# Patient Record
Sex: FEMALE | Race: BLACK OR AFRICAN AMERICAN | NOT HISPANIC OR LATINO | URBAN - NONMETROPOLITAN AREA
[De-identification: names, ages, dates, MRNs, and addresses within clinical notes are randomized per-mention and may not be internally consistent; named-entity substitution may affect disease eponyms.]

---

## 2017-01-13 NOTE — PATIENT DISCUSSION
CATARACTS, OU - VISUALLY SIGNIFICANT. SCHEDULE PHACO WITH IOL OS FIRST THEN LATER OD IF VISUAL SYMPTOMS PERSIST.   DISCUSSED SYMFONY

## 2017-01-13 NOTE — PATIENT DISCUSSION
MODERATE KERATOCONJUCTIVITIS WITH DRY EYE, OU:  CONTINUE HIGH QUALITY ARTIFICIAL TEARS BID &ndash; TID. RECOMMEND THE DAILY INTAKE OF OMEGA-3 DHA/EPA FATTY ACIDS TO HELP RELIEVE SYMPTOMS WITH PRIMARY CARE PHYSICIANS APPROVAL. ADD NIGHTLY LUBRICATING OINTMENT OR GEL. CONTINUE RESTASIS BID OU. WILL CONSIDER PUNCTAL PLUGS NEXT VISIT IF NOT RESPONSIVE OR IF SYMPTOMS PERSIST. RETURN FOR FOLLOW-UP AS SCHEDULED OR SOONER IF SYMPTOMS WORSEN.

## 2017-01-13 NOTE — PATIENT DISCUSSION
Scheduling Cataract Surgery Counseling: I have discussed the option of scheduling cataract surgery versus following, as well as the risks, benefits and alternatives of surgery with the patient. It was explained that the surgery is elective; there is no rush and no harm in waiting to have surgery. It was also explained that there is no guarantee that removing the cataract will improve their vision. The patient understands and desires to proceed with cataract surgery with implantation of an intraocular lens to improve their vision for driving and watching tv.

## 2017-01-13 NOTE — PATIENT DISCUSSION
Keratoconjuctivitis with dry eye Counseling: I have discussed the diagnosis and the pathophysiology of this disease with the patient. Vision may be limited by dry eye, and symptoms exacerbated by environmental factors such as smoke, wind, or prolonged eye use. Treatment options include, but are not limited to, artificial tears, punctal plugs, topical cyclosporine, oral omega-3 supplements, Lipiflow, moisture goggles, and lubricating ointments. I stressed the importance of compliance with treatment.

## 2017-02-02 NOTE — PATIENT DISCUSSION
New Prescription: Refresh Celluvisc (carboxymethylcellulose sodium): dropperette,gel: 1% 1 drop at bedtime into both eyes 02-

## 2017-02-15 NOTE — PATIENT DISCUSSION
Refractive Error Counseling:  I have discussed the options for refractive surgery to decrease dependency on glasses and/or contact lenses after cataract surgery. These options include: correction of astigmatism with limbal relaxing incision(s), LenSx arcuate incision(s), TORIC IOL, monovision, extended range of vision IOL, and multifocal IOL. It was emphasized to the patient that the goal of reducing spectacle dependence not spectacle freedom is more realistic. The patient understands it is possible they may still need a weak spectacle prescription to achieve their best vision. No visual outcome was guaranteed. The patient understands and desires to proceed with refractive cataract surgery.

## 2017-02-15 NOTE — PATIENT DISCUSSION
New Prescription: Ilevro (nepafenac): drops,suspension: 0.3% 1 drop every morning as directed into right eye 02-

## 2017-02-15 NOTE — PATIENT DISCUSSION
[facility]2017[phone] -&nbsp; &nbsp; &nbsp; rikki

## 2017-02-15 NOTE — PATIENT DISCUSSION
Continue: Refresh Celluvisc (carboxymethylcellulose sodium): dropperette,gel: 1% 1 drop at bedtime into both eyes 02-

## 2017-02-15 NOTE — PATIENT DISCUSSION
REFRACTIVE ERROR - PRESBYOPIA AND ASTIGMATISM:  PATIENT DESIRES TO HAVE THEIR REFRACTIVE ERROR SURGICALLY CORRECTED WITH A SYMFONY EXTENDED RANGE OF VISION IOL AND LENSX.

## 2017-02-15 NOTE — PATIENT DISCUSSION
New Prescription: Vigamox (moxifloxacin): drops: 0.5% 1 drop three times a day as directed into right eye 02-

## 2017-02-15 NOTE — PATIENT DISCUSSION
Surgery Drop Counseling:  I have prescribed Vigamox, Ilevro and Pred Forte for use as directed before and after cataract surgery.

## 2017-02-15 NOTE — PATIENT DISCUSSION
Surgery Counseling:  I have discussed the option of glasses versus cataract surgery versus following, It was explained that when vision no longer meets the patient's visual needs and a new prescription for glasses is not likely to improve the patient's visual symptoms, the option of cataract surgery is a reasonable next step. It was explained that there is no guarantee that removing the cataract will improve their visual symptoms; however, it is believed that the cataract is contributing to the patient's visual impairment and surgery may noticeably improve both the visual and functional status of the patient. After this discussion, the patient desires to proceed with cataract surgery with implantation of an intraocular lens to improve their vision for  driving and applying makeup.

## 2017-02-15 NOTE — PATIENT DISCUSSION
New Prescription: Pred Forte (prednisolone acetate): drops,suspension: 1% 1 drop three times a day as directed into right eye 02-

## 2017-03-08 NOTE — PATIENT DISCUSSION
S/P PC IOL, OS. : GOOD POST OP RESULT. STOP ANTIBIOTIC DROP IN ONE WEEK. CONTINUE OTHER DROPS AS DIRECTED UNTIL FURTHER INSTRUCTION. RETURN FOR FOLLOW-UP IN 3 WEEKS. CONSIDER SWITCHING FROM Bari Handsome TO RESTASIS DUE TO COST.

## 2017-03-08 NOTE — PATIENT DISCUSSION
Continue: Pred Forte (prednisolone acetate): drops,suspension: 1% 1 drop as directed into both eyes 02-

## 2017-03-27 NOTE — PATIENT DISCUSSION
Post-Op Instructions OD: Pred Forte (Prednisolone) In 2 days reduce to 1 time per day for 1 week, then discontinue. Ilevro (Nepafenac) 1 time per day for 9 days, then discontinue.

## 2017-03-27 NOTE — PATIENT DISCUSSION
*PATIENT WAS PRESCRIBED XIIDRA BY DR. Nadeem Bermudez BUT DID NOT RECEIVE A VOUCHER CARD TO USE AT THE [facility]. I GAVE HER A SAMPLE PACK OF Malu Chol, VOUCHER CARD AND INFORMATION BROCHURE AT TODAY'S VISIT. OKAY TO START USING ONCE POSTOP DROPS ARE COMPLETED.

## 2017-03-27 NOTE — PATIENT DISCUSSION
PCF, OD: BECOMING VISUALLY SIGNIFICANT. CONTINUE TO FOLLOW FOR NOW AND FOLLOW UP WITH DR. STILES IN 3 MONTHS FOR A PCF CHECK, OR SOONER IF SYMPTOMS WORSEN. OFFER SPEC RX UPDATE.

## 2017-03-27 NOTE — PATIENT DISCUSSION
S/P PE IOL, OU:  DOING WELL. CONTINUE DROPS AS DIRECTED. SPECS RX OFFERED. RX ARC IN SPECS TO MINIMIZE GLARE. RETURN FOR FOLLOW-UP AS SCHEDULED.

## 2017-05-05 NOTE — PATIENT DISCUSSION
MODERATE KERATOCONJUCTIVITIS WITH DRY EYE, OU:  CONTINUE HIGH QUALITY ARTIFICIAL TEARS BID &ndash; TID. RECOMMEND THE DAILY INTAKE OF OMEGA-3 DHA/EPA FATTY ACIDS TO HELP RELIEVE SYMPTOMS WITH PRIMARY CARE PHYSICIANS APPROVAL. STOP RESTASIS TODAY. WILL CONSIDER PUNCTAL PLUGS NEXT VISIT IF NOT RESPONSIVE OR IF SYMPTOMS PERSIST. RETURN FOR FOLLOW-UP AS SCHEDULED OR SOONER IF SYMPTOMS WORSEN.

## 2017-05-05 NOTE — PATIENT DISCUSSION
OCULAR ROSACEA, OU:  PRESCRIBE WARM COMPRESSES,  EYELID SCRUBS QD-BID, AND GOOD QUALITY ARTIFICIAL TEARS BID-QID. RECOMMEND THE DAILY INTAKE OF OMEGA-3 FATTY ACIDS WITH PRIMARY CARE PHYSICIANS APPROVAL. PRESCRIBE MINOCYCLINE 50MG ONCE A DAY WITH BREAKFAST AND MAXITROL OINTMENT TO LIDS BEFORE BEDTIME. WILL CONSIDER  LIPIFLOW FOR EXACERBATIONS IF SYMPTOMS WORSEN. REFER TO DR. Raquel Bertrand FOR EVALUATION AND TREATMENT.

## 2017-05-05 NOTE — PATIENT DISCUSSION
New Prescription: Maxitrol (neomycin-polymyxin-dexameth): ointment: 3.5-10,000-0.1 mg-unit/g-% a small amount at bedtime on eyelid

## 2017-05-05 NOTE — PATIENT DISCUSSION
Stopped Today: Restasis (cyclosporine): dropperette: 0.05% 1 drop twice a day as directed into both eyes

## 2017-05-05 NOTE — PATIENT DISCUSSION
Conjunctivochalasis Counseling:  I have explained the diagnosis, also referred to as redundant conjunctiva, and its pathophysiology to the patient. This condition is usually asymptomatic and related to the aging eye. It can cause irritation, watery eyes, redness and may cause blurring of vision while reading. Treatment options include artificial tears, antihistamines, and topical steroids. If medical therapy is unsuccessful, surgical resection of the redundant conjunctival tissues is indicated. No treatment is needed while the patient is asymptomatic.

## 2017-06-14 NOTE — PATIENT DISCUSSION
AZAR/K-Sicca OU - Continue daily warm compresses, and artificial tears 2-3 times a day OU. Continue oral PRN DE fish oil. Refresh Celluvisc QHS/QAM OU.  Refresh Optive Advanced 2-3 times a day OU

## 2017-06-14 NOTE — PATIENT DISCUSSION
CONJUNCTIVOCHALASIS, OU:  Continue daily warm compresses. Discussed conjuntivoplasty in office (whichever is more bothersome). One eye at a time. stressed whether in office or operating room she will need a .

## 2017-06-14 NOTE — PATIENT DISCUSSION
Continue: Maxitrol (neomycin-polymyxin-dexameth): ointment: 3.5-10,000-0.1 mg-unit/g-% a small amount at bedtime on eyelid

## 2017-06-28 NOTE — PATIENT DISCUSSION
MODERATE / SEVERE  DRY EYE, OU: PRESCRIBED ARTIFICIAL TEARS 2-3 X A DAY OU AND REFRESH CELLUVISC QAM AND QHS OU. RECOMMENDS OMEGA-3 FISH OIL WITH PRIMARY CARE PHYSICIANS APPROVAL. RETURN FOR FOLLOW-UP AS SCHEDULED OR SOONER IF SYMPTOMS WORSEN.

## 2017-06-28 NOTE — PATIENT DISCUSSION
POSTERIOR CAPSULAR FIBROSIS, OU:  VISUALLY SIGNIFICANT. SCHEDULE YAG CAPSULOTOMY OS FIRST THEN LATER YAG CAPSULOTOMY OD IF VISUAL SYMPTOMS PERSIST.

## 2017-09-07 NOTE — PATIENT DISCUSSION
S/P YAG OU : STABLE. OKAY TO USE +1.50 FOR READING AND +1.25 FOR COMPUTER WORK. RETURN AS SCHEDULED.

## 2018-02-26 NOTE — PATIENT DISCUSSION
Hordeolum Counseling:  I explained to the patient that a hordeolum is an inflammatory reaction to trapped oil secretions in the eyelid. I also explained that while hordeolums usually respond well to treatment, some people are prone to recurrences of them. The patient was instructed on the use of warm compresses on the hordeolum for ten minutes, four times per day. I also instructed the patient to begin using Ocusoft lid scrubs or lid scrubs with baby shampoo twice daily. Return for follow-up as scheduled or sooner if symptoms worsen.

## 2018-02-26 NOTE — PATIENT DISCUSSION
DRY EYE SYNDROME, OU: PRESCRIBED ARTIFICIAL TEARS BID - QID, OU. TREAT UNDERLYING BLEPHARITIS. RETURN FOR FOLLOW-UP AS SCHEDULED OR SOONER IF SYMPTOMS WORSEN.

## 2018-02-26 NOTE — PATIENT DISCUSSION
QID AND MAXITROL OINTMENT BID APPLIED WITH LID MASSAGE FOLLOWING WARM COMPRESSES UNTIL RESOLVED. PATIENT ADVISED TO RETURN TO CLINIC FOR FOLLOW UP IN 1 MONTH WITH DR RESENDIZ IF THERE IS NOT FULL RESOLUTION, OR SOONER IF SYMPTOMS WORSEN.

## 2018-02-26 NOTE — PATIENT DISCUSSION
BLEPHARITIS, OU: PRESCRIBE WARM COMPRESSES AND OCUSOFT PLUS EYELID SCRUBS BID, ARTIFICIAL TEARS (SYSTANE BALANCE) QID AND THE DAILY INTAKE OF OMEGA-3 FATTY ACIDS. WILL CONSIDER LIPIFLOW TREATMENT NEXT VISIT IF NOT RESPONSIVE TO TREATMENT OR IF SYMPTOMS PERSIST. RETURN FOR FOLLOW-UP AS SCHEDULED.

## 2018-02-26 NOTE — PATIENT DISCUSSION
New Prescription: Maxitrol (neomycin-polymyxin-dexameth): ointment: 3.5-10,000-0.1 mg-unit/g-% a small amount twice a day as directed into affected eye 02-

## 2018-06-07 NOTE — PATIENT DISCUSSION
CONJUNCTIVOCHALASIS, OU:  PRESCRIBE GOOD QUALITY ARTIFICIAL TEARS. RETURN FOR FOLLOW-UP AS SCHEDULED.

## 2018-06-07 NOTE — PATIENT DISCUSSION
*BLEPHARITIS, OU: PRESCRIBE WARM COMPRESSES AND EYELID SCRUBS QD-BID, ARTIFICIAL TEARS BID-QID, AND USE REFRESH CELLUVISC QHS OU. RECOMMEND THE DAILY INTAKE OF OMEGA-3 FATTY ACIDS WITH PRIMARY CARE PHYSICIANS APPROVAL. RETURN FOR FOLLOW-UP AS SCHEDULED.

## 2019-06-04 ENCOUNTER — IMPORTED ENCOUNTER (OUTPATIENT)
Dept: URBAN - NONMETROPOLITAN AREA CLINIC 1 | Facility: CLINIC | Age: 64
End: 2019-06-04

## 2019-06-04 PROBLEM — H52.223: Noted: 2019-06-04

## 2019-06-04 PROBLEM — H52.4: Noted: 2019-06-04

## 2019-06-04 PROBLEM — H52.03: Noted: 2019-06-04

## 2019-06-04 PROBLEM — E11.9: Noted: 2020-12-21

## 2019-06-04 PROCEDURE — 92004 COMPRE OPH EXAM NEW PT 1/>: CPT

## 2019-06-04 PROCEDURE — 92015 DETERMINE REFRACTIVE STATE: CPT

## 2019-06-12 NOTE — PATIENT DISCUSSION
*BLEPHARITIS, OU: PRESCRIBE WARM COMPRESSES AND EYELID SCRUBS QD-BID, ARTIFICIAL TEARS BID-QID, AND USE ERYTHROMYCIN OINTMENT QHS OU. RECOMMEND THE DAILY INTAKE OF OMEGA-3 FATTY ACIDS WITH PRIMARY CARE PHYSICIANS APPROVAL. RETURN FOR FOLLOW-UP AS SCHEDULED.

## 2019-06-12 NOTE — PATIENT DISCUSSION
New Prescription: erythromycin (erythromycin): ointment: 5 mg/gram (0.5 %) 1 a small amount at bedtime as directed on eyelid 06-

## 2020-06-22 NOTE — PATIENT DISCUSSION
Continue: Refresh Optive Advanced ([id number]): drops: 0.5-1-0.5% 1 drop as needed into both eyes

## 2020-06-22 NOTE — PATIENT DISCUSSION
Continue: erythromycin (erythromycin): ointment: 5 mg/gram (0.5 %) 1 a small amount at bedtime as needed on eyelid 06-

## 2020-06-26 NOTE — PATIENT DISCUSSION
Called patient back and patient stated she is not having any sx and will plush more fluids and will call us to make a f/u in 2-3 weeks.    Lens Material:

## 2020-12-21 ENCOUNTER — IMPORTED ENCOUNTER (OUTPATIENT)
Dept: URBAN - NONMETROPOLITAN AREA CLINIC 1 | Facility: CLINIC | Age: 65
End: 2020-12-21

## 2020-12-21 PROBLEM — E11.9: Noted: 2020-12-21

## 2020-12-21 PROCEDURE — 92014 COMPRE OPH EXAM EST PT 1/>: CPT

## 2020-12-21 PROCEDURE — 92015 DETERMINE REFRACTIVE STATE: CPT

## 2020-12-21 NOTE — PATIENT DISCUSSION
Hyperopia-Discussed diagnosis with patient. Astigmatism-Discussed diagnosis with patient. Presbyopia-Discussed diagnosis with patient. Updated spec Rx given. Recommend lens that will provide comfort as well as protect safety and health of eyes. DM s DR-Stressed the importance of keeping blood sugars under control blood pressure under control and weight normalization and regular visits with PCP. -Explained the possible effects of poorly controlled diabetes and the damage that diabetes can cause to ocular health. -Patient to check HgbA1C.-Pt instructed to contact our office with any vision changes.; Dr's Notes: Daughter Lee Mcguire in [facility].

## 2021-04-26 NOTE — PATIENT DISCUSSION
Continue: Refresh Optive Advanced ([id number]): drops: 0.5-1-0.5% 1 drop as needed into both eyes

## 2021-08-04 NOTE — PATIENT DISCUSSION
Continue: Refresh Optive Advanced ([id number]-[id number]): drops: 0.5-1-0.5% 1 drop as needed into both eyes

## 2021-08-04 NOTE — PATIENT DISCUSSION
DRY EYE SYNDROME/OCULAR ROSACEA/MGD OU: EDUCATED PATIENT ON FINDINGS AND CONDITIONS. PRESCRIBE PF ARTIFICIAL TEARS 4-6X/DAY OU, WARM COMPRESSES WITH MASSAGE QD-BID OU AND ERYTHROMYCIN QHS OU. ALSO PRESCRIBE DOXYCYCLINE 50MG BID PO X 1 MONTH. FOLLOW-UP IN 1 MONTH OR SOONER WITH ANY WORSENING OF SI/SX. MONITOR.

## 2021-09-02 NOTE — PATIENT DISCUSSION
OCULAR ROSACEA/MGD/DRY EYE SYNDROME OU: EDUCATED PATIENT ON FINDINGS AND CONDITIONS. MINIMAL IMPROVEMENT ON CURRENT TREATMENT WITH PERSISTENT INFLAMMATION. PRESCRIBE TOBRADEX QID OU X 2 WEEKS, THEN BID OU X 2 WEEKS THEN DISCONTINUE. CONTINUE PF ARTIFICIAL TEARS 4-6X/DAY OU, WARM COMPRESSES WITH MASSAGE QD-BID OU AND ERYTHROMYCIN DONAVON QHS OU. CONTINUE FULL COURSE OF DOXYCYCLINE 50MG BID PO AS PRESCRIBED. FOLLOW-UP IN 1 MONTH OR SOONER WITH ANY WORSENING OF SI/SX - WILL CONSIDER LIPIFLOW REFERRAL. MONITOR.

## 2021-09-02 NOTE — PATIENT DISCUSSION
CONJUNCTIVOCHALASIS OU: EDUCATED PATIENT ON FINDINGS. SIGNIFICANT AND LIKELY CONTRIBUTING TO PATIENT'S PERSISTENT IRRITATION. WILL CONSIDER REFERRAL TO DR. Marvin Ramirez FOR EVALUATION AT FOLLOW-UP VISIT. MONITOR.

## 2021-09-02 NOTE — PATIENT DISCUSSION
Continue: erythromycin (erythromycin): ointment: 5 mg/gram (0.5 %) a small amount at bedtime as directed into both eyes 08-.

## 2021-09-02 NOTE — PATIENT DISCUSSION
Continue: Refresh Optive Advanced ([id number]): drops: 0.5-1-0.5% 1 drop as needed into both eyes.

## 2021-09-02 NOTE — PATIENT DISCUSSION
Continue: doxycycline monohydrate (doxycycline monohydrate): capsule: 50 mg 1 capsule twice a day as directed by mouth 08-.

## 2021-09-02 NOTE — PATIENT DISCUSSION
New Prescription: tobramycin-dexamethasone (tobramycin-dexamethasone): drops,suspension: 0.3-0.1% 1 drop four times a day as directed into both eyes 09-.

## 2021-10-22 NOTE — PATIENT DISCUSSION
Educated patient on findings. Good improvement and patient feeling much better. Continue Refresh LISA-3 ATs QID/prn OU, warm compresses QD-BID OU, and erythromycin debby qhs OU. Prescribe eyelid scrubs QD-BID OU. Discussed good hygiene and proper makeup application. Advised to RTC if si/sx persist or worsen. Monitor.

## 2022-04-15 ASSESSMENT — TONOMETRY
OS_IOP_MMHG: 20
OS_IOP_MMHG: 21
OD_IOP_MMHG: 21
OD_IOP_MMHG: 20

## 2022-04-15 ASSESSMENT — VISUAL ACUITY
OD_CC: 20/25+2
OD_SC: 20/20
OD_CC: J1
OU_SC: 20/20
OS_CC: J1
OD_SC: 20/20
OS_SC: 20/20-1
OS_CC: 20/25+2
OS_SC: 20/20

## 2022-05-05 ENCOUNTER — COMPREHENSIVE EXAM (OUTPATIENT)
Dept: RURAL CLINIC 1 | Facility: CLINIC | Age: 67
End: 2022-05-05

## 2022-05-05 DIAGNOSIS — H52.223: ICD-10-CM

## 2022-05-05 DIAGNOSIS — H52.03: ICD-10-CM

## 2022-05-05 DIAGNOSIS — E11.9: ICD-10-CM

## 2022-05-05 DIAGNOSIS — H52.4: ICD-10-CM

## 2022-05-05 DIAGNOSIS — H25.13: ICD-10-CM

## 2022-05-05 PROCEDURE — 99214 OFFICE O/P EST MOD 30 MIN: CPT

## 2022-05-05 PROCEDURE — 92015 DETERMINE REFRACTIVE STATE: CPT

## 2022-05-05 ASSESSMENT — VISUAL ACUITY
OU_CC: 20/20
OD_CC: 20/20
OS_CC: 20/25

## 2022-05-05 ASSESSMENT — TONOMETRY
OS_IOP_MMHG: 20
OD_IOP_MMHG: 20

## 2022-05-26 NOTE — PATIENT DISCUSSION
Baseline HVF today non-specific inferior defect OD, WNL OS. No treatment indicated at this time. Repeat HVF 24-2 OD only in 6 months - if defect repeatable will consider initiating treatment. Follow-up as directed. Monitor.

## 2023-05-08 ENCOUNTER — COMPREHENSIVE EXAM (OUTPATIENT)
Dept: RURAL CLINIC 1 | Facility: CLINIC | Age: 68
End: 2023-05-08

## 2023-05-08 DIAGNOSIS — H52.4: ICD-10-CM

## 2023-05-08 DIAGNOSIS — E11.9: ICD-10-CM

## 2023-05-08 DIAGNOSIS — H52.223: ICD-10-CM

## 2023-05-08 DIAGNOSIS — H25.13: ICD-10-CM

## 2023-05-08 DIAGNOSIS — H52.03: ICD-10-CM

## 2023-05-08 PROCEDURE — 99214 OFFICE O/P EST MOD 30 MIN: CPT

## 2023-05-08 ASSESSMENT — TONOMETRY
OS_IOP_MMHG: 18
OD_IOP_MMHG: 18

## 2023-05-08 ASSESSMENT — VISUAL ACUITY
OU_CC: 20/20
OD_CC: 20/20-2
OS_CC: 20/20
OU_CC: 20/20
OS_CC: 20/20-1
OD_CC: 20/20

## 2024-05-09 ENCOUNTER — COMPREHENSIVE EXAM (OUTPATIENT)
Dept: RURAL CLINIC 1 | Facility: CLINIC | Age: 69
End: 2024-05-09

## 2024-05-09 DIAGNOSIS — H25.13: ICD-10-CM

## 2024-05-09 DIAGNOSIS — E11.9: ICD-10-CM

## 2024-05-09 DIAGNOSIS — H10.45: ICD-10-CM

## 2024-05-09 PROCEDURE — 92014 COMPRE OPH EXAM EST PT 1/>: CPT

## 2024-05-09 ASSESSMENT — VISUAL ACUITY
OU_CC: 20/20-1
OS_CC: 20/40-1
OD_CC: 20/20
OU_CC: 20/20
OD_CC: 20/30-1
OS_CC: 20/20

## 2024-05-09 ASSESSMENT — TONOMETRY
OD_IOP_MMHG: 18
OS_IOP_MMHG: 18

## 2025-03-25 NOTE — PATIENT DISCUSSION
Chorioretinal Scar is an area of pigmentary change or fibrosis that is located on the inside surface of the eye. These may be the result of an old infection or injury, but in some cases we do not know the source. If the chorioretinal scar is in the central retina, it can cause decreased vision. However, in most cases it is located away from the center and does not cause any problems. Ok to DC fluids per Dr Garcia

## 2025-05-12 ENCOUNTER — COMPREHENSIVE EXAM (OUTPATIENT)
Age: 70
End: 2025-05-12

## 2025-05-12 DIAGNOSIS — H25.13: ICD-10-CM

## 2025-05-12 DIAGNOSIS — H10.45: ICD-10-CM

## 2025-05-12 DIAGNOSIS — E11.9: ICD-10-CM

## 2025-05-12 PROCEDURE — 92014 COMPRE OPH EXAM EST PT 1/>: CPT
